# Patient Record
Sex: FEMALE | Race: OTHER | HISPANIC OR LATINO | URBAN - METROPOLITAN AREA
[De-identification: names, ages, dates, MRNs, and addresses within clinical notes are randomized per-mention and may not be internally consistent; named-entity substitution may affect disease eponyms.]

---

## 2023-02-25 ENCOUNTER — OFFICE VISIT (OUTPATIENT)
Dept: URGENT CARE | Age: 33
End: 2023-02-25

## 2023-02-25 VITALS
HEIGHT: 64 IN | OXYGEN SATURATION: 99 % | WEIGHT: 170 LBS | BODY MASS INDEX: 29.02 KG/M2 | RESPIRATION RATE: 18 BRPM | TEMPERATURE: 97.2 F | HEART RATE: 88 BPM

## 2023-02-25 DIAGNOSIS — N10 ACUTE PYELONEPHRITIS: Primary | ICD-10-CM

## 2023-02-25 LAB
SL AMB  POCT GLUCOSE, UA: NEGATIVE
SL AMB LEUKOCYTE ESTERASE,UA: ABNORMAL
SL AMB POCT BILIRUBIN,UA: ABNORMAL
SL AMB POCT BLOOD,UA: ABNORMAL
SL AMB POCT CLARITY,UA: CLEAR
SL AMB POCT COLOR,UA: YELLOW
SL AMB POCT KETONES,UA: 5
SL AMB POCT NITRITE,UA: POSITIVE
SL AMB POCT PH,UA: 7.5
SL AMB POCT SPECIFIC GRAVITY,UA: 1.01
SL AMB POCT URINE HCG: NEGATIVE
SL AMB POCT URINE PROTEIN: ABNORMAL
SL AMB POCT UROBILINOGEN: 0.2

## 2023-02-25 NOTE — PROGRESS NOTES
[address]        NAME: Earl Lea is a 33 y o  female  : 1990    MRN: [id number]  DATE: 2023  TIME: 12:00 PM      Assessment and Plan     Acute pyelonephritis [N10]  1  Acute pyelonephritis  POCT urine dip    POCT urine HCG    Urine culture    Transfer to other facility        poct hcg negative  poct urine dip shows moderate amount of leukocytes, positive nitrates, non hemolyzed trace blood  Urine culture sent  Patient agreeable to proceed to the ER for further evaluation  Requesting to go to [facility]  Patient Instructions     Proceed to the ER for further evaluation  Chief Complaint     Chief Complaint   Patient presents with   • Abdominal Pain     Abdominal pain LLQ radiating to belly button x 2 days  sudden onset while at work  Patient also has nausea with pain then going to back  Patient states no chance of pregnancy  History of Present Illness     Patient is a 71-year-old female who presents with worsening left-sided abdominal pain over the past 2 days  States the pain does radiate to her flank and back  Reports urinary frequency  Denies pain with urination  Reports nausea no vomiting  Reports diarrhea  Reports chills no fever  Reports history of UTIs but states they are not normally this bad  Denies chance of pregnancy  Review of Systems     Review of Systems   Constitutional: Positive for chills  Negative for fever  Gastrointestinal: Positive for abdominal pain, diarrhea and nausea  Negative for vomiting  Genitourinary: Positive for flank pain and frequency  Negative for vaginal bleeding  Musculoskeletal: Positive for back pain  All other systems reviewed and are negative  Current Medications     No current outpatient medications on file      Current Allergies     Allergies as of 2023   • (No Known Allergies)              The following portions of the patient's history were reviewed and updated as appropriate: allergies, current medications, past family history, past medical history, past social history, past surgical history and problem list      History reviewed  No pertinent past medical history  Past Surgical History:   Procedure Laterality Date   •  SECTION  2019   • ECTOPIC PREGNANCY SURGERY         History reviewed  No pertinent family history  Medications have been verified  Objective     Pulse 88   Temp (!) 97 2 °F (36 2 °C) (Tympanic)   Resp 18   Ht 5' 4" (1 626 m)   Wt 77 1 kg (170 lb)   SpO2 99%   BMI 29 18 kg/m²   No LMP recorded  Physical Exam     Physical Exam  Vitals and nursing note reviewed  Constitutional:       General: She is awake  She is not in acute distress  Appearance: Normal appearance  She is not toxic-appearing or diaphoretic  Cardiovascular:      Rate and Rhythm: Normal rate  Pulses: Normal pulses  Heart sounds: Normal heart sounds, S1 normal and S2 normal    Pulmonary:      Effort: Pulmonary effort is normal       Breath sounds: Normal breath sounds and air entry  Abdominal:      General: Abdomen is flat  Bowel sounds are normal       Palpations: Abdomen is soft  Tenderness: There is abdominal tenderness in the periumbilical area, suprapubic area, left upper quadrant and left lower quadrant  There is left CVA tenderness and guarding  There is no right CVA tenderness  Skin:     General: Skin is warm  Capillary Refill: Capillary refill takes less than 2 seconds  Neurological:      Mental Status: She is alert  Psychiatric:         Mood and Affect: Mood normal          Behavior: Behavior normal          Thought Content:  Thought content normal          Judgment: Judgment normal

## 2023-02-26 LAB — BACTERIA UR CULT: ABNORMAL

## 2023-02-27 ENCOUNTER — TELEPHONE (OUTPATIENT)
Dept: URGENT CARE | Age: 33
End: 2023-02-27

## 2023-02-27 LAB — BACTERIA UR CULT: ABNORMAL

## 2023-02-27 NOTE — TELEPHONE ENCOUNTER
Patient states she did go to the ER after leaving urgent care  States they did start her on keflex in the ER- aware this antibiotic is susceptible

## 2025-01-04 ENCOUNTER — OFFICE VISIT (OUTPATIENT)
Dept: URGENT CARE | Age: 35
End: 2025-01-04

## 2025-01-04 VITALS
HEART RATE: 80 BPM | BODY MASS INDEX: 33.13 KG/M2 | WEIGHT: 180 LBS | TEMPERATURE: 97.8 F | SYSTOLIC BLOOD PRESSURE: 122 MMHG | HEIGHT: 62 IN | DIASTOLIC BLOOD PRESSURE: 96 MMHG | RESPIRATION RATE: 18 BRPM

## 2025-01-04 DIAGNOSIS — G43.909 MIGRAINE WITHOUT STATUS MIGRAINOSUS, NOT INTRACTABLE, UNSPECIFIED MIGRAINE TYPE: Primary | ICD-10-CM

## 2025-01-04 PROBLEM — N93.9 ABNORMAL UTERINE BLEEDING (AUB): Status: ACTIVE | Noted: 2022-02-28

## 2025-01-04 PROBLEM — Z71.85 HPV VACCINE COUNSELING: Status: ACTIVE | Noted: 2022-02-28

## 2025-01-04 PROBLEM — N20.0 LEFT NEPHROLITHIASIS: Status: ACTIVE | Noted: 2018-01-09

## 2025-01-04 PROBLEM — J45.901 ASTHMA EXACERBATION, MILD: Status: ACTIVE | Noted: 2022-05-03

## 2025-01-04 PROCEDURE — G0382 LEV 3 HOSP TYPE B ED VISIT: HCPCS

## 2025-01-04 PROCEDURE — 96372 THER/PROPH/DIAG INJ SC/IM: CPT

## 2025-01-04 RX ORDER — KETOROLAC TROMETHAMINE 30 MG/ML
30 INJECTION, SOLUTION INTRAMUSCULAR; INTRAVENOUS ONCE
Status: COMPLETED | OUTPATIENT
Start: 2025-01-04 | End: 2025-01-04

## 2025-01-04 RX ORDER — BUPROPION HYDROCHLORIDE 150 MG/1
150 TABLET ORAL EVERY MORNING
COMMUNITY
Start: 2024-10-22

## 2025-01-04 RX ORDER — TOPIRAMATE 25 MG/1
1 TABLET, FILM COATED ORAL 2 TIMES DAILY
COMMUNITY
Start: 2024-10-22

## 2025-01-04 RX ADMIN — KETOROLAC TROMETHAMINE 30 MG: 30 INJECTION, SOLUTION INTRAMUSCULAR; INTRAVENOUS at 17:36

## 2025-01-04 NOTE — PROGRESS NOTES
[facility]        NAME: Iona Ambrose is a 34 y.o. female  : 1990    MRN: [id number]  DATE: 2025  TIME: 5:31 PM    Assessment and Plan   Migraine without status migrainosus, not intractable, unspecified migraine type [G43.909]  1. Migraine without status migrainosus, not intractable, unspecified migraine type  ketorolac (TORADOL) injection 30 mg        One-time dose of toradol given in clinic. VSS in clinic, appears in no acute distress. Educated on use of OTC products for additional relief of symptoms. Advised close follow-up with PCP or to report to the ER if symptoms worsen. Patient verbalizes understanding and agreeable to plan.       Patient Instructions     Continue tylenol/ibuprofen (wait at least 8 hours) and other medications as previously prescribed. Follow-up with PCP in 3-5 days if no improvement of symptoms. Report to the ER sooner if symptoms     Chief Complaint     Chief Complaint   Patient presents with    Migraine     Patient reports headache since Monday night. Reports taking Tylenol extra strength with not much improvement. Reports right sided pounding headache that radiates towards the front. Reports no changes in vision. Reports following up with ophthalmologist ~4 months ago. Reports some dizziness on new years. Reports last menstrual cycle last week.         History of Present Illness       34 year old female presents for evaluation of migraine x 4 days. She reports h/o migraines in the past and relates symptoms are similar but slightly worse. She relates she does have an appointment with a neurologist in 4 months. She has been prescribed topirmate in the past for migraines but did not take the medication during this time. She denies any known triggers - foods, medications, acitivity. She did have her menstrual cycle last week but relates it was lighter than normal. She has had a tubal ligation and denies concern for pregnancy at this time. She denies vision  "changes, nausea, vomiting, lightheadedness, or dizziness. She reports mild light sensitivity. She's been taking tylenol and a medication \"that starts with an M\" that a family member with minimal symptom relief.     Migraine  This is a recurrent problem. The current episode started in the past 7 days. The problem occurs constantly. The problem is unchanged. The pain is present in the bilateral. The pain does not radiate. The pain quality is similar to prior headaches. The quality of the pain is described as aching, band-like and throbbing. The pain is at a severity of 8/10. The pain is moderate. Associated symptoms include photophobia. Pertinent negatives include no abdominal pain, abnormal behavior, anorexia, back pain, blurred vision, coughing, diarrhea, dizziness, drainage, ear pain, eye pain, eye redness, eye watering, facial sweating, fever, hearing loss, insomnia, loss of balance, muscle aches, nausea, neck pain, numbness, phonophobia, rhinorrhea, seizures, sinus pressure, sore throat, swollen glands, tingling, tinnitus, visual change, vomiting, weakness or weight loss. The symptoms are aggravated by bright light. Past treatments include acetaminophen. The treatment provided no relief. Her past medical history is significant for migraine headaches.       Review of Systems   Review of Systems   Constitutional:  Negative for activity change, appetite change, chills, fatigue, fever and weight loss.   HENT:  Negative for ear pain, hearing loss, rhinorrhea, sinus pressure, sore throat and tinnitus.    Eyes:  Positive for photophobia. Negative for blurred vision, pain, discharge, redness, itching and visual disturbance.   Respiratory:  Negative for cough, chest tightness and shortness of breath.    Cardiovascular:  Negative for chest pain and palpitations.   Gastrointestinal:  Negative for abdominal pain, anorexia, constipation, diarrhea, nausea and vomiting.   Musculoskeletal:  Negative for arthralgias, back pain, " "myalgias and neck pain.   Skin:  Negative for color change and pallor.   Allergic/Immunologic: Negative for environmental allergies and food allergies.   Neurological:  Positive for headaches. Negative for dizziness, tingling, seizures, weakness, light-headedness, numbness and loss of balance.   Psychiatric/Behavioral:  The patient does not have insomnia.          Current Medications       Current Outpatient Medications:     buPROPion (WELLBUTRIN XL) 150 mg 24 hr tablet, Take 150 mg by mouth every morning, Disp: , Rfl:     topiramate (TOPAMAX) 25 mg tablet, Take 1 tablet by mouth 2 (two) times a day, Disp: , Rfl:     Current Facility-Administered Medications:     ketorolac (TORADOL) injection 30 mg, 30 mg, Intramuscular, Once,     Current Allergies     Allergies as of 2025    (No Known Allergies)            The following portions of the patient's history were reviewed and updated as appropriate: allergies, current medications, past family history, past medical history, past social history, past surgical history and problem list.     History reviewed. No pertinent past medical history.    Past Surgical History:   Procedure Laterality Date     SECTION  2019    ECTOPIC PREGNANCY SURGERY         History reviewed. No pertinent family history.      Medications have been verified.        Objective   /96   Pulse 80   Temp 97.8 °F (36.6 °C) (Tympanic)   Resp 18   Ht 5' 2\" (1.575 m)   Wt 81.6 kg (180 lb)   BMI 32.92 kg/m²        Physical Exam     Physical Exam  Vitals and nursing note reviewed.   Constitutional:       Appearance: Normal appearance. She is normal weight.   HENT:      Head: Normocephalic and atraumatic.      Right Ear: Tympanic membrane, ear canal and external ear normal.      Left Ear: Tympanic membrane, ear canal and external ear normal.   Cardiovascular:      Rate and Rhythm: Normal rate and regular rhythm.      Pulses: Normal pulses.      Heart sounds: Normal heart sounds. "   Pulmonary:      Effort: Pulmonary effort is normal.      Breath sounds: Normal breath sounds.   Musculoskeletal:      Cervical back: Normal range of motion and neck supple.   Neurological:      General: No focal deficit present.      Mental Status: She is alert and oriented to person, place, and time.   Psychiatric:         Mood and Affect: Mood normal.         Behavior: Behavior normal.         Thought Content: Thought content normal.         Judgment: Judgment normal.

## 2025-01-04 NOTE — PATIENT INSTRUCTIONS
Continue tylenol/ibuprofen (wait at least 8 hours) and other medications as previously prescribed. Follow-up with PCP in 3-5 days if no improvement of symptoms. Report to the ER sooner if symptoms worsen.